# Patient Record
Sex: FEMALE | Race: WHITE | NOT HISPANIC OR LATINO | URBAN - METROPOLITAN AREA
[De-identification: names, ages, dates, MRNs, and addresses within clinical notes are randomized per-mention and may not be internally consistent; named-entity substitution may affect disease eponyms.]

---

## 2023-05-08 ENCOUNTER — HOSPITAL ENCOUNTER (OUTPATIENT)
Dept: DATA CONVERSION | Facility: HOSPITAL | Age: 68
End: 2023-05-08

## 2023-05-08 DIAGNOSIS — G43.909 MIGRAINE, UNSPECIFIED, NOT INTRACTABLE, WITHOUT STATUS MIGRAINOSUS: ICD-10-CM

## 2023-05-08 DIAGNOSIS — M24.831 OTHER SPECIFIC JOINT DERANGEMENTS OF RIGHT WRIST, NOT ELSEWHERE CLASSIFIED: ICD-10-CM

## 2023-05-08 DIAGNOSIS — S66.811A STRAIN OF OTHER SPECIFIED MUSCLES, FASCIA AND TENDONS AT WRIST AND HAND LEVEL, RIGHT HAND, INITIAL ENCOUNTER: ICD-10-CM

## 2023-05-08 DIAGNOSIS — S63.591A OTHER SPECIFIED SPRAIN OF RIGHT WRIST, INITIAL ENCOUNTER: ICD-10-CM

## 2023-05-08 DIAGNOSIS — Z95.818 PRESENCE OF OTHER CARDIAC IMPLANTS AND GRAFTS: ICD-10-CM

## 2023-05-08 DIAGNOSIS — Z79.82 LONG TERM (CURRENT) USE OF ASPIRIN: ICD-10-CM

## 2023-05-08 DIAGNOSIS — I10 ESSENTIAL (PRIMARY) HYPERTENSION: ICD-10-CM

## 2023-05-08 DIAGNOSIS — Q21.12 PATENT FORAMEN OVALE (HHS-HCC): ICD-10-CM

## 2023-05-08 DIAGNOSIS — M24.131 OTHER ARTICULAR CARTILAGE DISORDERS, RIGHT WRIST: ICD-10-CM

## 2023-09-07 VITALS — BODY MASS INDEX: 30.17 KG/M2 | HEIGHT: 67 IN | WEIGHT: 192.24 LBS

## 2023-10-02 NOTE — OP NOTE
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  Exploration dorsal right wrist with  open repair of TFCC ulnar peripheral tear and extensor carpi ulnaris tendon sheath incision/release with transposition of the extensor carpi ulnaris tendon to the dorsal right wrist.  2.   3.   4.   5.   Surgeon: Dr. Mau Willard MD   Resident/Fellow/Other Assistant: Roly Felix   Anesthesia: LMA   Estimated Blood Loss (mL): Less than 10 cc   Specimen: no   Complications: None   Findings: Chronic TFCC ulnar peripheral tear and  ECU tendinitis   Patient Returned To/Condition: Stable   Tourniquet Times: 79 minutes     Operative Report Dictated:  Dictation: no       Electronic Signatures:  Mau Willard)  (Signed 08-May-2023 14:43)   Authored: Post Operative Note, Note Completion      Last Updated: 08-May-2023 14:43 by Mau Willard)

## 2024-05-06 NOTE — OP NOTE
SURGEON:  Mau Willard Jr, MD    PREOPERATIVE DIAGNOSES:    Previous traumatic injury of the right wrist with chronic  pain/dysfunction and chronic ulnar peripheral triangular  fibrocartilage complex tear of the distal radial ulnar joint and  extensor carpi ulnaris tendinitis/subluxation.     POSTOPERATIVE DIAGNOSES:    Previous traumatic injury with chronic right wrist pain/dysfunction  secondary to ulnar peripheral triangular fibrocartilage complex tear  involving the distal radial ulnar joint and extensor carpi ulnaris  tendinitis/subluxation.     OPERATIVE PROCEDURE:    The patient underwent exploration of dorsal right wrist with  arthrotomy, primary repair/reconstruction of the ulnar peripheral  triangular fibrocartilage complex tear and extensor carpi ulnaris  release and reconstruction with transposition/transfer of the  extensor carpi ulnaris tendon to the dorsal wrist location.     ASSISTANT:    Roly Mcqueen    ANESTHESIA:    LMA.    ESTIMATED BLOOD LOSS:    Less than 10 cc.    CONDITION:    Stable.    COMPLICATIONS:    None.    SPECIMEN:    None.    DETAILS OF THE PROCEDURE:    The patient was identified preoperatively with her .  Risks  and benefits of surgical intervention were once again reviewed with  them.  Risks include anesthesia, infection, bleeding, injury to  adjacent structures, paralysis, paresthesias, dysfunction, deformity,  further dysfunction, further deformity, scarring, recurrence,  nonresolution of symptoms, worsening of symptoms, possible need for  further surgical interventions among others.  We have discussed her  current clinical situation and treatment options at length.  She  understands all of our discussions.  She wished to proceed with  surgical intervention.  She states that this has been causing her  pain and dysfunction of her right wrist for over a year.  The  appropriate consent was obtained.  The dorsal wrist was appropriately  identified and marked  preoperatively with a marking pen.  She  received preoperative IV antibiotics.  SCDs were in place.  The  appropriate preoperative safety checklist was performed.  She was  then taken to the operating room and placed in supine position on the  operating room table.  After adequate induction of general  anesthesia, patient's right upper extremity was cleaned, prepped, and  draped in usual sterile fashion.  A proximal upper extremity  tourniquet was applied.  The entire procedure performed under loupe  magnification.  The operating room staff had appropriate radiation  badges and lead gowns in place for the intraoperative use of mini  C-arm fluoroscopic imaging.  Right hand was further identified.  The  appropriate longitudinally oriented curvilinear incision site on the  dorsal right wrist between the fifth and sixth extensor compartments  was marked with a marking pen.  The appropriate time-out procedure  was performed.  The Esmarch was then employed to exsanguinate the  right upper extremity and the proximal upper extremity tourniquet was  inflated.  The right hand and wrist were then repositioned on the  hand table.  The appropriate incision was then made with the scalpel.   Subcutaneous tissue was dissected with tenotomy scissors.  Excellent  hemostasis was maintained with electrocautery.  The appropriate flaps  were dissected, raised, elevated, and retracted with a small  Weitlaner retractor.  Further dissection with tenotomy scissors was  performed.  The 6 compartment was identified and the extensor  retinaculum was incised at its volar margin of the sixth compartment.   The ECU subsheath was opened longitudinally in its midline.  The  extensor carpi ulnaris tendon was retracted with Ragnell retractor  and further dissection with tenotomy scissors was performed.  The  wrist joint capsule was identified.  An arthrotomy was performed, the  incision through the wrist joint capsule exposing the distal  ulnar  styloid and distal radial ulnar joint area.  There was immediate  return of some joint fluid.  The capsule volarly was grasped and  examination for possible ganglion cyst was performed.  No specific  ganglion cyst was identified.  The ulnar peripheral tear in the TFC  was identified.  The proximal and distal ends were sharply debrided  with the sharp straight and sharp curved scissors.  I then created 3  transosseous tunnels from the ulnar neck to the fovea with 0.45  K-wires.  Three holes were created in the distal ulna extending from  the dorsal aspect of the ulnar neck to the fovea.  Two horizontal  mattress sutures of 2-0 PDS were then passed from distal to proximal  through the ulnar periphery of the TFCC which lies near the fovea by  entering through the ulnocarpal capsulotomy and exiting through the  distal radial ulnar joint capsulotomy.  The sutures were passed  through the bone holes with the use of a straight needle with 1 limb  of each suture sharing the center hole of the created bone tunnels.  The sutures were then tied over the ulnar neck with the joint reduced  and the forearm in neutral rotation.  Copious irrigation of saline  solution was performed.  Excellent hemostasis was maintained with  electrocautery.  Excellent reconstruction/repair of the ulnar  peripheral tear of the TFCC was accomplished.  The dorsal distal  radial ulnar joint capsule was closed with a few figure-of-eight  sutures of 3-0 Vicryl and 3-0 Ethibond.  Next, I turned my attention  to the extensor carpi ulnaris.  Given the chronic ECU tendinitis and  subluxation, the subsheath was then imbricated with a few  figure-of-eight and horizontal mattress sutures of 3-0 Ethibond.  The  extensor carpi ulnaris tendon was then transferred/transposed  dorsally and a flap was created from the extensor retinaculum central  third area using 2 parallel incisions extending from its divided  ulnar edge to the septum between the fourth and  fifth compartments.  The flap of extensor retinaculum was then passed under the extensor  carpi ulnaris tendon and back over the tendon suturing the edge of  the flap to the more proximal portion of the extensor retinaculum  with 2 horizontal mattress sutures of 3-0 Ethibond and 2  figure-of-eight sutures of 3-0 Ethibond.  This created a loose sling  to maintain the extensor carpi ulnaris tendon in the  transferred/transposed dorsal wrist position.  A hemostat was able to  be placed between the extensor carpi ulnaris tendon and the extensor  retinaculum flap demonstrating that the extensor retinaculum flap was  not too tight.  The wrist was gently placed through some flexion and  extension and demonstrates excellent excursion/movement of the  extensor carpi ulnaris tendon in its new position.  The remainder of  the extensor retinaculum was repaired anatomically to the volar edge  with several figure-of-eight 3-0 Ethibond sutures.  The operative  site was copiously irrigated with saline solution.  The proximal  upper extremity tourniquet was then released.  Total tourniquet time  was 79 minutes.  The entire hand and wrist area became pink, warm,  and had a capillary refill of 1 to 2 seconds.  Excellent hemostasis  was maintained with electrocautery.  Further copious irrigation with  saline solution was performed.  Excellent hemostasis was  demonstrated.  The skin edges were then reapproximated with several  simple interrupted inverted deep dermal sutures of 4-0 Monocryl.  The  skin edges were then reapproximated with a running 5-0 nylon suture.  The operative site area was then injected in local circumferential  fashion with approximately 10 cc of 1:1 mixture of 2% plain lidocaine  solution and 0.25% plain Marcaine solution for prolonged  postoperative pain relief.  Right hand and wrist were further cleaned  and dried.  Bacitracin, Xeroform, and the appropriate bulky  protective immobilizing sterile dressing and long  arm plaster splint  were then fashioned and applied.  Distal tips of all 5 digits  remained pink, warm, and had a capillary refill of 1 to 2 seconds  after dressing and splint application.  She tolerated the procedure  very well.  She awoke from anesthesia without difficulty and was  transferred to the recovery room in stable condition.       Mau Willard Jr, MD    DD:  05/10/2023 20:09:36 EST  DT:  05/11/2023 02:39:05 EST  DICTATION NUMBER:  [id number]  INTERNAL JOB NUMBER:  [id number]      [facility]          Electronic Signatures:  Mau Willard) (Signed on 12-May-2023 10:18)   Authored  Unsigned, Draft (SYS GENERATED) (Entered on 11-May-2023 02:39)   Entered    Last Updated: 12-May-2023 10:18 by Mau Willard)

## 2025-04-11 ENCOUNTER — APPOINTMENT (OUTPATIENT)
Dept: DERMATOLOGY | Facility: CLINIC | Age: 70
End: 2025-04-11
Payer: MEDICARE

## 2025-04-11 DIAGNOSIS — L57.0 ACTINIC KERATOSIS: ICD-10-CM

## 2025-04-11 DIAGNOSIS — B07.8 OTHER VIRAL WARTS: ICD-10-CM

## 2025-04-11 DIAGNOSIS — D18.01 HEMANGIOMA OF SKIN: ICD-10-CM

## 2025-04-11 DIAGNOSIS — L72.0 EPIDERMAL CYST: ICD-10-CM

## 2025-04-11 DIAGNOSIS — L81.4 LENTIGO: ICD-10-CM

## 2025-04-11 DIAGNOSIS — L71.9 ROSACEA: ICD-10-CM

## 2025-04-11 DIAGNOSIS — L82.1 SEBORRHEIC KERATOSIS: ICD-10-CM

## 2025-04-11 DIAGNOSIS — L57.9 SKIN CHANGES DUE TO CHRONIC EXPOSURE TO NONIONIZING RADIATION: Primary | ICD-10-CM

## 2025-04-11 PROCEDURE — 17110 DESTRUCTION B9 LES UP TO 14: CPT | Performed by: STUDENT IN AN ORGANIZED HEALTH CARE EDUCATION/TRAINING PROGRAM

## 2025-04-11 PROCEDURE — 99204 OFFICE O/P NEW MOD 45 MIN: CPT | Performed by: STUDENT IN AN ORGANIZED HEALTH CARE EDUCATION/TRAINING PROGRAM

## 2025-04-11 PROCEDURE — 1159F MED LIST DOCD IN RCRD: CPT | Performed by: STUDENT IN AN ORGANIZED HEALTH CARE EDUCATION/TRAINING PROGRAM

## 2025-04-11 RX ORDER — METRONIDAZOLE 7.5 MG/G
1 CREAM TOPICAL 2 TIMES DAILY
Qty: 45 G | Refills: 11 | Status: SHIPPED | OUTPATIENT
Start: 2025-04-11

## 2025-04-11 NOTE — PROGRESS NOTES
Subjective     Ana Chery is a 69 y.o. female who presents for the following: Skin Check (FBSE. No Hx of skin cancer. Patient has a cyst on upper right right back which is draining.).     Review of Systems:  No other skin or systemic complaints other than what is documented elsewhere in the note.    The following portions of the chart were reviewed this encounter and updated as appropriate:         Skin Cancer History  No skin cancer on file.      Specialty Problems    None       Objective   Well appearing patient in no apparent distress; mood and affect are within normal limits.    A full examination was performed including scalp, head, eyes, ears, nose, lips, neck, chest, axillae, abdomen, back, buttocks, bilateral upper extremities, bilateral lower extremities, hands, feet, fingers, toes, fingernails, and toenails. All findings within normal limits unless otherwise noted below.    Assessment/Plan   1. Skin changes due to chronic exposure to nonionizing radiation  Mottled pigmentation, telangiectasias and brown reticular macules in sun exposed areas on the face, extremities, trunk    The risk of chronic, cumulative sun damage and risk of development of skin cancer was reviewed with the patient today. Discussed important of sun protection with sun protective clothing and/or broad spectrum sunscreen spf 30 or above.  Warning signs of skin cancer were reviewed. Patient to contact office should they notice any new or changing pre-existing skin lesion.    Related Procedures  Follow Up In Dermatology - Established Patient    2. Rosacea  Head - Anterior (Face)  Central facial erythema and pustules    Chronic condition, flaring today  Metrocream daily  Avoid triggers    metroNIDAZOLE (Metrocream) 0.75 % cream - Head - Anterior (Face)  Apply 1 Application topically 2 times a day. To face    3. Epidermal cyst  Right Upper Back  1.5 cm subcutaneous nodule  Tender growing  Will excise    Prior Authorization for Skin  Excision - Cysts - Right Upper Back    4. Other viral warts  Right Foot - Posterior  Verrucous papules with dot vascular pattern that disrupt normal skin lines.    Reviewed viral etiology and transmissible nature of the condition. Reviewed treatment options including LN2 cryotherapy, observation, WartPeel, imiquimod, intralesional therapies, shave removal, and laser treatment.    Patient opted for treatment with LN2 today.     Destr of lesion - Right Foot - Posterior  Complexity: simple    Destruction method: cryotherapy    Informed consent: discussed and consent obtained    Lesion destroyed using liquid nitrogen: Yes    Outcome: patient tolerated procedure well with no complications    Post-procedure details: wound care instructions given      5. Lentigo  Scattered tan macules in sun-exposed areas.    Benign nature of these skin lesions reviewed and relation to sun exposure discussed. Reassurance provided. Reviewed warning signs of skin cancer.    6. Hemangioma of skin  Bright red papules    Discussed benign nature of condition, reassured. Reviewed warning signs of skin cancer with patient.    7. Seborrheic keratosis  Stuck on verrucous, tan-brown papules and plaques.      The benign nature of these skin lesions were reviewed with the patient today and reassurance was provided. Patient advised to return for f/u if the lesions change in size, shape, color, become painful, tender, itch or bleed.    3 treated with LN2 as a courtesy today    8. Actinic keratosis  Gritty macules on forehead    Discussed diagnosis, pre cancerous nature  Declined treatment with LN2 today  Reviewed efudex  Will re-evaluate in 6 months  Discussed risk of progression to skin cancer

## 2025-05-02 ENCOUNTER — APPOINTMENT (OUTPATIENT)
Dept: DERMATOLOGY | Facility: CLINIC | Age: 70
End: 2025-05-02
Payer: MEDICARE

## 2025-05-02 DIAGNOSIS — D48.5 NEOPLASM OF UNCERTAIN BEHAVIOR OF SKIN: Primary | ICD-10-CM

## 2025-05-02 DIAGNOSIS — L72.0 EPIDERMAL CYST: ICD-10-CM

## 2025-05-02 PROCEDURE — 12032 INTMD RPR S/A/T/EXT 2.6-7.5: CPT

## 2025-05-02 PROCEDURE — 11402 EXC TR-EXT B9+MARG 1.1-2 CM: CPT

## 2025-05-02 NOTE — PROGRESS NOTES
Excision Operative Note    Date of Surgery:  5/2/2025  Surgeon:  Saige Devine MD    Assessment/Plan   NEOPLASM OF UNCERTAIN BEHAVIOR OF SKIN  Right Upper Back  Skin excision    Lesion length (cm):  1.8  Lesion width (cm):  1.8  Margin per side (cm):  0  Total excision diameter (cm):  1.8  Informed consent: discussed and consent obtained    Timeout: patient name, date of birth, surgical site, and procedure verified    Procedure prep:  Patient prepped in sterile fashion  Anesthesia: the lesion was anesthetized in a standard fashion    Anesthetic:  1% lidocaine w/ epinephrine 1-100,000 local infiltration  Instrument used: #10 blade    Hemostasis achieved with: electrodesiccation    Outcome: patient tolerated procedure well with no complications    Post-procedure details: sterile dressing applied and wound care instructions given    Dressing type: pressure dressing    Additional details:  The possible diagnoses were explained. Although the lesion is likely benign, the patient requests removal of the lesion because of the symptoms it is causing. Excision was discussed with the patient. The risks, benefits and potential adverse effects were reviewed. Discussion included but was not limited to the cure rate, relative cost, wound care requirements, activity restrictions, likely scar outcome and time to heal were reviewed. Alternative options including monitoring the lesion were discussed. The patient elected to proceed with excision.     Skin repair  Complexity:  Intermediate  Final length (cm):  3  Informed consent: discussed and consent obtained    Timeout: patient name, date of birth, surgical site, and procedure verified    Procedure prep:  Patient prepped in sterile fashion  Anesthesia: the lesion was anesthetized in a standard fashion    Anesthetic:  1% lidocaine w/ epinephrine 1-100,000 local infiltration  Reason for type of repair: reduce tension to allow closure and preserve normal anatomical and functional  relationships    Undermining: edges could be approximated without difficulty and edges undermined    Subcutaneous layers (deep stitches):   Suture size:  3-0  Suture type: Vicryl (polyglactin 910)    Stitches:  Buried vertical mattress  Fine/surface layer approximation (top stitches):   Suture size:  4-0  Hemostasis achieved with: electrodesiccation  Outcome: patient tolerated procedure well with no complications    Post-procedure details: sterile dressing applied and wound care instructions given    Dressing type: pressure dressing    Specimen 1 - Dermatopathology- DERM LAB  Differential Diagnosis: rupture cyst  Check Margins Yes/No?:    Comments:    Dermpath Lab: Routine Histopathology (formalin-fixed tissue)  EPIDERMAL CYST      Related Procedures  Prior Authorization for Skin Excision - Cysts    Flor Angel MD, SUNG  PGY-3, Department of Dermatology    I was present for the entirety of the procedure(s).     Saige Devine MD

## 2025-05-06 LAB
LABORATORY COMMENT REPORT: NORMAL
PATH REPORT.FINAL DX SPEC: NORMAL
PATH REPORT.GROSS SPEC: NORMAL
PATH REPORT.MICROSCOPIC SPEC OTHER STN: NORMAL
PATH REPORT.RELEVANT HX SPEC: NORMAL
PATH REPORT.TOTAL CANCER: NORMAL

## 2025-05-16 ENCOUNTER — APPOINTMENT (OUTPATIENT)
Dept: DERMATOLOGY | Facility: CLINIC | Age: 70
End: 2025-05-16
Payer: MEDICARE

## 2025-05-16 DIAGNOSIS — Z48.02 ENCOUNTER FOR REMOVAL OF SUTURES: ICD-10-CM

## 2025-05-16 PROCEDURE — 99024 POSTOP FOLLOW-UP VISIT: CPT | Performed by: STUDENT IN AN ORGANIZED HEALTH CARE EDUCATION/TRAINING PROGRAM

## 2025-05-16 NOTE — PROGRESS NOTES
Bertrand Chery is a 69 y.o. female who presents for the following: Suture / Staple Removal (Pt 14 days post-op for epidermal cyst located on Right Upper Back. Pt reports no difficulties or concerns, followed care instructions. No s/s of infection observed at this time. ).          Review of Systems:  No other skin or systemic complaints other than what is documented elsewhere in the note.    The following portions of the chart were reviewed this encounter and updated as appropriate:          Skin Cancer History  Biopsy Log Book  No skin cancers from Specimen Tracking.    Additional History      Specialty Problems    None       Objective   Well appearing patient in no apparent distress; mood and affect are within normal limits.    A focused skin examination was performed. All findings within normal limits unless otherwise noted below.    Assessment/Plan   Skin Exam  1. ENCOUNTER FOR REMOVAL OF SUTURES  Right Upper Back  Suture Removal - Right Upper Back    Performed by: Rola Resendez RN  Authorized by: Saige Devine MD  Consent: Verbal consent obtained  Risks and benefits: risks, benefits and alternatives were discussed  Consent given by: patient  Patient understanding: patient states understanding of the procedure being performed  Patient identity confirmed: verbally with patient  Wound Appearance: clean  Sutures Removed: 2  Post-removal: dressing applied  Facility: sutures placed in this facility  Patient tolerance: patient tolerated the procedure well with no immediate complications

## 2025-05-23 ENCOUNTER — APPOINTMENT (OUTPATIENT)
Dept: DERMATOLOGY | Facility: CLINIC | Age: 70
End: 2025-05-23
Payer: MEDICARE

## 2025-05-23 DIAGNOSIS — B07.8 OTHER VIRAL WARTS: ICD-10-CM

## 2025-05-23 DIAGNOSIS — L90.5 SCAR CONDITIONS AND FIBROSIS OF SKIN: ICD-10-CM
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
## 2025-05-23 DIAGNOSIS — L82.1 SEBORRHEIC KERATOSIS: ICD-10-CM

## 2025-05-23 RX ORDER — TRIAMCINOLONE ACETONIDE 1 MG/G
CREAM TOPICAL 2 TIMES DAILY
Qty: 30 G | Refills: 3 | Status: SHIPPED | OUTPATIENT
Start: 2025-05-23 | End: 2025-05-30

## 2025-05-23 NOTE — PROGRESS NOTES
Subjective     Ana Chery is a 69 y.o. female who presents for the following: Wart (Patient would like warts on right foot treated with LN. ) and Suspicious Skin Lesion (Patient is concerned with round spot on left wrist which first appeared 3 weeks ago. ).          Review of Systems:  No other skin or systemic complaints other than what is documented elsewhere in the note.    The following portions of the chart were reviewed this encounter and updated as appropriate:          Skin Cancer History  Biopsy Log Book  No skin cancers from Specimen Tracking.    Additional History      Specialty Problems    None       Objective   Well appearing patient in no apparent distress; mood and affect are within normal limits.    A focused skin examination was performed. All findings within normal limits unless otherwise noted below.    Assessment/Plan   Skin Exam  1. BITTEN OR STUNG BY NONVENOMOUS INSECT AND OTHER NONVENOMOUS ARTHROPODS, INITIAL ENCOUNTER  Left Forearm - Anterior  Erythematous scaly plaque on ventral left wrist      Reports it has been present for 2 weeks  Was very itchy, now is somewhat scaly   Reports rubbing it  Favor inflamed insect bite or sting  Triamcinolone 0.1% cream bid x 7 days  To call if worsening or not improving  triamcinolone (Kenalog) 0.1 % cream - Left Forearm - Anterior  Apply topically 2 times a day for 7 days. To spot on left wrist  2. SCAR CONDITIONS AND FIBROSIS OF SKIN  Right Upper Back  Well healing scar from excision    Discussed itching is due to healing process  Deep stitches will dissolve in ~1 month, will feel a little lumpy until these are completely dissolved   3. SEBORRHEIC KERATOSIS  Generalized  Stuck on verrucous, tan-brown papules and plaques.    The benign nature of these skin lesions were reviewed with the patient today and reassurance was provided. Patient advised to return for f/u if the lesions change in size, shape, color, become painful, tender, itch or bleed.  4.  OTHER VIRAL WARTS  Right Foot - Posterior  Verrucous papules with dot vascular pattern that disrupt normal skin lines.  Reviewed viral etiology and transmissible nature of the condition. Reviewed treatment options including LN2 cryotherapy, observation, WartPeel, imiquimod, intralesional therapies, shave removal, and laser treatment.    Patient opted for treatment with LN2 today.   Destr of lesion - Right Foot - Posterior  Complexity: simple    Destruction method: cryotherapy    Informed consent: discussed and consent obtained    Lesion destroyed using liquid nitrogen: Yes    Outcome: patient tolerated procedure well with no complications    Post-procedure details: wound care instructions given

## 2025-05-23 NOTE — Clinical Note
Well healing scar from excision    Discussed itching is due to healing process  Deep stitches will dissolve in ~1 month, will feel a little lumpy until these are completely dissolved

## 2025-05-23 NOTE — Clinical Note
Erythematous scaly plaque on ventral left wrist      Reports it has been present for 2 weeks  Was very itchy, now is somewhat scaly   Reports rubbing it  Favor inflamed insect bite or sting  Triamcinolone 0.1% cream bid x 7 days  To call if worsening or not improving

## 2025-05-23 NOTE — Clinical Note
Reviewed viral etiology and transmissible nature of the condition. Reviewed treatment options including LN2 cryotherapy, observation, WartPeel, imiquimod, intralesional therapies, shave removal, and laser treatment.    Patient opted for treatment with LN2 today.

## 2025-10-14 ENCOUNTER — APPOINTMENT (OUTPATIENT)
Dept: DERMATOLOGY | Facility: CLINIC | Age: 70
End: 2025-10-14
Payer: MEDICARE